# Patient Record
Sex: FEMALE | Race: WHITE | NOT HISPANIC OR LATINO
[De-identification: names, ages, dates, MRNs, and addresses within clinical notes are randomized per-mention and may not be internally consistent; named-entity substitution may affect disease eponyms.]

---

## 2024-02-28 PROBLEM — Z00.00 ENCOUNTER FOR PREVENTIVE HEALTH EXAMINATION: Status: ACTIVE | Noted: 2024-02-28

## 2024-03-26 ENCOUNTER — APPOINTMENT (OUTPATIENT)
Dept: UROLOGY | Facility: CLINIC | Age: 69
End: 2024-03-26
Payer: MEDICARE

## 2024-03-26 VITALS
TEMPERATURE: 98.96 F | HEART RATE: 74 BPM | BODY MASS INDEX: 31.15 KG/M2 | SYSTOLIC BLOOD PRESSURE: 192 MMHG | WEIGHT: 165 LBS | HEIGHT: 61 IN | DIASTOLIC BLOOD PRESSURE: 82 MMHG | OXYGEN SATURATION: 98 %

## 2024-03-26 DIAGNOSIS — L98.8 OTHER SPECIFIED DISORDERS OF THE SKIN AND SUBCUTANEOUS TISSUE: ICD-10-CM

## 2024-03-26 DIAGNOSIS — Z86.79 PERSONAL HISTORY OF OTHER DISEASES OF THE CIRCULATORY SYSTEM: ICD-10-CM

## 2024-03-26 DIAGNOSIS — Z78.9 OTHER SPECIFIED HEALTH STATUS: ICD-10-CM

## 2024-03-26 PROCEDURE — 99204 OFFICE O/P NEW MOD 45 MIN: CPT

## 2024-03-28 PROBLEM — Z86.79 HISTORY OF HYPERTENSION: Status: RESOLVED | Noted: 2024-03-28 | Resolved: 2024-03-28

## 2024-03-28 PROBLEM — Z78.9 SOCIAL ALCOHOL USE: Status: ACTIVE | Noted: 2024-03-28

## 2024-03-28 PROBLEM — Z78.9 NON-SMOKER: Status: ACTIVE | Noted: 2024-03-28

## 2024-03-28 NOTE — PHYSICAL EXAM
[de-identified] : Patient hesitant to have pelvic exam today. We will hold off on physical exam until Cystoscopy.

## 2024-03-28 NOTE — ASSESSMENT
[FreeTextEntry1] :   Impression/Plan: 68-year-old female with vesicovaginal fistula, occlusion of right ureter.   -All records requested  -NM renal function study -MRI ABD/PEL-Urogram -F/u for Cystoscopy after imaging done -Discussed surgical options including urinary diversion and fistula repair. Will discuss further after imaging and testing completed.   I, Dr. Kika Mcknight, personally performed the evaluation and management (E/M) services for this new patient.  That E/M includes conducting the clinically appropriate initial history &/or exam, assessing all conditions, and establishing the plan of care.  Today, my SHAUNA Rose, was here to observe &/or participate in the visit & follow plan of care established by me.

## 2024-03-28 NOTE — HISTORY OF PRESENT ILLNESS
[FreeTextEntry1] : 68-year-old () female here for evaluation of vesicovaginal fistula. Reports complete loss of bladder control on 24. States urine started leaking from vagina and has not stopped. She has been wearing diapers since. She has also had spotting which is worse with bowel movements. She saw Urologist Dr. Wooten at [facility] who saw a fistula on exam. He did a cystoscopy on 24, biopsy was negative. She was referred to GYN/ONC Dr. Nilda Huber who sent her for a CT. Result requested.  Patient also reports having a right ureteral occlusion/infection 7 years ago which required a stent. States her right kidney functions at 20%.   CT ABD/PEL 3/21/24 -No new mass or adenopathy -Right kidney remains a presumably a nonfunctioning hydronephrotic sac. As on previous study there is abrupt occlusion of the right ureter at the level of the true pelvis.    -New left hydroureteronephrosis to the level of the left pelvic sidewall without obstructing calculus or obvious mass. This is likely related to a post therapeutic stricture. There is no obvious cortical volume loss. -Cholelithiasis  PATHOLOGY 24 Partially denuded bladder mucosa with acute inflammation, granulation tissue and reactive changes. No atypia and/or malignancy recognized.    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 Drinks alcohol socially.  Meds/Vitamins-losartan, Toprol Allergies-Hydrochlorothiazide (leg swelling)

## 2024-03-28 NOTE — LETTER BODY
[Dear  ___] : Dear  [unfilled], [Consult Letter:] : I had the pleasure of evaluating your patient, [unfilled]. [Please see my note below.] : Please see my note below. [Consult Closing:] : Thank you very much for allowing me to participate in the care of this patient.  If you have any questions, please do not hesitate to contact me. [Sincerely,] : Sincerely, [FreeTextEntry3] : Kika Mcknight MD System Director Urogynecology/FPS Department of Urology [facility]    at [facility]  of [facility]rology [facility] at [facility]

## 2024-04-01 ENCOUNTER — APPOINTMENT (OUTPATIENT)
Dept: UROLOGY | Facility: CLINIC | Age: 69
End: 2024-04-01

## 2024-04-01 ENCOUNTER — APPOINTMENT (OUTPATIENT)
Dept: NUCLEAR MEDICINE | Facility: HOSPITAL | Age: 69
End: 2024-04-01

## 2024-04-01 ENCOUNTER — OUTPATIENT (OUTPATIENT)
Dept: OUTPATIENT SERVICES | Facility: HOSPITAL | Age: 69
LOS: 1 days | End: 2024-04-01
Payer: MEDICARE

## 2024-04-01 PROCEDURE — A9562: CPT

## 2024-04-01 PROCEDURE — 78707 K FLOW/FUNCT IMAGE W/O DRUG: CPT

## 2024-04-01 PROCEDURE — 78707 K FLOW/FUNCT IMAGE W/O DRUG: CPT | Mod: 26,MH

## 2024-04-02 ENCOUNTER — APPOINTMENT (OUTPATIENT)
Dept: MRI IMAGING | Facility: HOSPITAL | Age: 69
End: 2024-04-02

## 2024-04-02 ENCOUNTER — OUTPATIENT (OUTPATIENT)
Dept: OUTPATIENT SERVICES | Facility: HOSPITAL | Age: 69
LOS: 1 days | End: 2024-04-02
Payer: MEDICARE

## 2024-04-02 ENCOUNTER — RESULT REVIEW (OUTPATIENT)
Age: 69
End: 2024-04-02

## 2024-04-02 PROCEDURE — 72197 MRI PELVIS W/O & W/DYE: CPT | Mod: 26,MH

## 2024-04-02 PROCEDURE — A9585: CPT

## 2024-04-02 PROCEDURE — 74183 MRI ABD W/O CNTR FLWD CNTR: CPT

## 2024-04-02 PROCEDURE — 74183 MRI ABD W/O CNTR FLWD CNTR: CPT | Mod: 26,MH

## 2024-04-02 PROCEDURE — 72197 MRI PELVIS W/O & W/DYE: CPT | Mod: MH

## 2024-04-15 ENCOUNTER — APPOINTMENT (OUTPATIENT)
Dept: UROLOGY | Facility: CLINIC | Age: 69
End: 2024-04-15

## 2024-04-29 ENCOUNTER — APPOINTMENT (OUTPATIENT)
Dept: UROLOGY | Facility: CLINIC | Age: 69
End: 2024-04-29
Payer: MEDICARE

## 2024-04-29 VITALS
DIASTOLIC BLOOD PRESSURE: 85 MMHG | HEART RATE: 65 BPM | OXYGEN SATURATION: 99 % | TEMPERATURE: 210.56 F | SYSTOLIC BLOOD PRESSURE: 175 MMHG

## 2024-04-29 DIAGNOSIS — N13.5 CROSSING VESSEL AND STRICTURE OF URETER W/OUT HYDRONEPHROSIS: ICD-10-CM

## 2024-04-29 DIAGNOSIS — N82.0 VESICOVAGINAL FISTULA: ICD-10-CM

## 2024-04-29 DIAGNOSIS — N39.0 URINARY TRACT INFECTION, SITE NOT SPECIFIED: ICD-10-CM

## 2024-04-29 LAB
BILIRUB UR QL STRIP: NORMAL
CLARITY UR: CLEAR
COLLECTION METHOD: NORMAL
GLUCOSE UR-MCNC: NORMAL
HCG UR QL: 1 EU/DL
HGB UR QL STRIP.AUTO: NORMAL
KETONES UR-MCNC: NORMAL
LEUKOCYTE ESTERASE UR QL STRIP: NORMAL
NITRITE UR QL STRIP: POSITIVE
PH UR STRIP: 8
PROT UR STRIP-MCNC: 300
SP GR UR STRIP: 1025

## 2024-04-29 PROCEDURE — 99215 OFFICE O/P EST HI 40 MIN: CPT

## 2024-04-29 RX ORDER — SULFAMETHOXAZOLE AND TRIMETHOPRIM 800; 160 MG/1; MG/1
800-160 TABLET ORAL
Qty: 14 | Refills: 0 | Status: ACTIVE | COMMUNITY
Start: 2024-04-29 | End: 1900-01-01

## 2024-04-30 PROBLEM — N13.5 OCCLUSION OF URETER: Status: ACTIVE | Noted: 2024-03-26

## 2024-04-30 PROBLEM — N82.0 VESICOVAGINAL FISTULA: Status: ACTIVE | Noted: 2024-03-26

## 2024-04-30 NOTE — LETTER BODY
[Dear  ___] : Dear  [unfilled], [Courtesy Letter:] : I had the pleasure of seeing your patient, [unfilled], in my office today. [Please see my note below.] : Please see my note below. [Consult Closing:] : Thank you very much for allowing me to participate in the care of this patient.  If you have any questions, please do not hesitate to contact me. [Sincerely,] : Sincerely, [FreeTextEntry3] : Kika Mcknight MD System Director Urogynecology/FPS Department of Urology [facility]    at [facility]  of Urology [facility] [employer] [facility] at [facility]

## 2024-04-30 NOTE — HISTORY OF PRESENT ILLNESS
[FreeTextEntry1] : 68-year-old woman with a past medical history of cervical cancer status post hysterectomy, radiation and chemotherapy in 1992.  The patient has developed a vesicovaginal fistula.  She had had complications of right ureteral occlusion in the past which required a stent, currently denies any abdominal or flank pain.  She did have a renogram which revealed essentially no function on the right side with 95% of the kidney function on the left side.  There was partial obstruction noted on the renogram.  She did have new left hydronephrosis noted on the CT scan at the level of the uterovesical junction.  MRI imaging also showed a nearly 1 cm nodule near the point of obstruction in the vaginal area.  She had been seen by her gynecologist recently and told no concerning findings on a previous CT scan, however this is changed based on the MRI imaging.  The patient is here today for cystoscopy, however her urinalysis was positive for nitrites so this was postponed.  On exam today, her vaginal exam reveals a large fistula proximately 1 cm at the cuff of the vagina.  The area is firm, necrotic, and erythematous.  Plan: We had a lengthy discussion with regards to the MRI finding of this with nearly 1 cm lesion in the vaginal area as well as her exam findings being concerning for possible malignancy.  She had had a cystoscopy with bladder biopsy by her urologist approximately a month ago which showed chronic inflammation.  Her exam findings are concerning.  I have put out a call to Dr. Huber her gynecologist to further discuss.  We also had a long discussion with regards to treatment options for her fistula including a transabdominal fistula repair with possible ureteral reimplant versus a diversion in the form of an ileal conduit.  She seems to be leaning towards more definitive surgery with a diversion.  After spoken with Dr. Huber with regards to her gynecologic oncologic care, we will make a decision moving forward. Bactrim x 7 days given.